# Patient Record
Sex: FEMALE | Race: BLACK OR AFRICAN AMERICAN | Employment: UNEMPLOYED | URBAN - METROPOLITAN AREA
[De-identification: names, ages, dates, MRNs, and addresses within clinical notes are randomized per-mention and may not be internally consistent; named-entity substitution may affect disease eponyms.]

---

## 2017-03-10 ENCOUNTER — HOSPITAL ENCOUNTER (EMERGENCY)
Age: 37
Discharge: HOME | End: 2017-03-10
Attending: EMERGENCY MEDICINE
Payer: MEDICAID

## 2017-03-10 VITALS
DIASTOLIC BLOOD PRESSURE: 119 MMHG | SYSTOLIC BLOOD PRESSURE: 158 MMHG | RESPIRATION RATE: 16 BRPM | OXYGEN SATURATION: 100 % | TEMPERATURE: 98.6 F | HEART RATE: 101 BPM

## 2017-03-10 DIAGNOSIS — L02.419 CELLULITIS AND ABSCESS OF LEG, EXCEPT FOOT: Primary | ICD-10-CM

## 2017-03-10 DIAGNOSIS — L03.119 CELLULITIS AND ABSCESS OF LEG, EXCEPT FOOT: Primary | ICD-10-CM

## 2017-03-10 DIAGNOSIS — I15.9 SECONDARY HYPERTENSION: ICD-10-CM

## 2017-03-10 PROCEDURE — 99283 EMERGENCY DEPT VISIT LOW MDM: CPT

## 2017-03-10 PROCEDURE — 74011000250 HC RX REV CODE- 250: Performed by: EMERGENCY MEDICINE

## 2017-03-10 PROCEDURE — 75810000293 HC SIMP/SUPERF WND  RPR

## 2017-03-10 PROCEDURE — 74011250637 HC RX REV CODE- 250/637: Performed by: EMERGENCY MEDICINE

## 2017-03-10 RX ORDER — DOXYCYCLINE 100 MG/1
100 CAPSULE ORAL
Status: COMPLETED | OUTPATIENT
Start: 2017-03-10 | End: 2017-03-10

## 2017-03-10 RX ORDER — LIDOCAINE HYDROCHLORIDE AND EPINEPHRINE 10; 10 MG/ML; UG/ML
10 INJECTION, SOLUTION INFILTRATION; PERINEURAL ONCE
Status: COMPLETED | OUTPATIENT
Start: 2017-03-10 | End: 2017-03-10

## 2017-03-10 RX ORDER — DOXYCYCLINE 100 MG/1
100 CAPSULE ORAL 2 TIMES DAILY
Qty: 14 CAP | Refills: 0 | Status: SHIPPED | OUTPATIENT
Start: 2017-03-10 | End: 2017-03-17

## 2017-03-10 RX ADMIN — DOXYCYCLINE 100 MG: 100 CAPSULE ORAL at 21:47

## 2017-03-10 RX ADMIN — LIDOCAINE HYDROCHLORIDE AND EPINEPHRINE 100 MG: 10; 10 INJECTION, SOLUTION INFILTRATION; PERINEURAL at 21:45

## 2017-03-11 NOTE — ED PROVIDER NOTES
HPI Comments: 9:47 PM Santiago Jones is a 30 y.o. female with a history of HTN and Anemia who presents to the emergency department for evaluation of a R leg abscess onset yesterday. The patient explains today she attempted to \"squeeze pus out\" with moderate relief. Pt states taking Tylenol for the symptoms earlier this morning. LMP was on 3/7. Pt denies tobacco usage. Pt admits alcohol usage. No other concerns at this time. PCP: None      The history is provided by the patient. Past Medical History:   Diagnosis Date    Anemia        Past Surgical History:   Procedure Laterality Date     DELIVERY ONLY           History reviewed. No pertinent family history. Social History     Social History    Marital status: SINGLE     Spouse name: N/A    Number of children: N/A    Years of education: N/A     Occupational History    Not on file. Social History Main Topics    Smoking status: Never Smoker    Smokeless tobacco: Not on file    Alcohol use No    Drug use: No    Sexual activity: Yes     Partners: Male     Other Topics Concern    Not on file     Social History Narrative         ALLERGIES: Review of patient's allergies indicates no known allergies. Review of Systems   Constitutional: Negative. HENT: Negative. Eyes: Negative. Respiratory: Negative. Cardiovascular: Negative. Gastrointestinal: Negative. Endocrine: Negative. Genitourinary: Negative. Musculoskeletal: Negative. Skin: Positive for wound (R leg abscess ). Allergic/Immunologic: Negative. Neurological: Negative. Hematological: Negative. Psychiatric/Behavioral: Negative. All other systems reviewed and are negative. Vitals:    03/10/17 2130   BP: (!) 158/119   Pulse: (!) 101   Resp: 16   Temp: 98.6 °F (37 °C)   SpO2: 100%            Physical Exam   Constitutional: She is oriented to person, place, and time. She appears well-developed and well-nourished.    HENT:   Head: Normocephalic and atraumatic. Eyes: Conjunctivae and EOM are normal. Pupils are equal, round, and reactive to light. Neck: Normal range of motion. Neck supple. Cardiovascular: Normal rate, regular rhythm, normal heart sounds and intact distal pulses. Pulmonary/Chest: Effort normal and breath sounds normal.   Abdominal: Soft. Bowel sounds are normal.   Musculoskeletal: Normal range of motion. Neurological: She is alert and oriented to person, place, and time. Skin: Skin is warm and dry. There is erythema. Abscess with mild fluctuance right lower extremity  Redness surrounding approximately 2 inch   Psychiatric: She has a normal mood and affect. Her behavior is normal. Judgment and thought content normal.   Nursing note and vitals reviewed. [facility]  ED Course       I&D Abcess Simple  Date/Time: 3/10/2017 9:54 PM  Performed by: Beth Mckay  Authorized by: Beth Mckay     Consent:     Consent obtained:  Verbal and written    Consent given by:  Patient    Risks discussed:  Bleeding and infection    Alternatives discussed:  No treatment  Location:     Type:  Abscess    Size:  3cm    Location:  Lower extremity    Lower extremity location:  Leg    Leg location:  R lower leg  Pre-procedure details:     Skin preparation:  Betadine  Anesthesia (see MAR for exact dosages): Anesthesia method:  None  Procedure type:     Complexity:  Simple  Procedure details:     Needle aspiration: no      Incision types:  Single straight    Incision depth:  Dermal    Scalpel blade:  11    Wound management:  Extensive cleaning    Drainage:  Serosanguinous    Drainage amount:  Scant    Wound treatment:  Wound left open    Packing materials:  None  Post-procedure details:     Patient tolerance of procedure:   Tolerated well, no immediate complications                 Vitals:  Patient Vitals for the past 12 hrs:   Temp Pulse Resp BP SpO2   03/10/17 2130 98.6 °F (37 °C) (!) 101 16 (!) 158/119 100 %     100% on RA, indicating adequate oxygenation. Medications ordered:   Medications   lidocaine-EPINEPHrine (XYLOCAINE) 1 %-1:100,000 injection 100 mg (100 mg IntraDERMal Given by Provider 3/10/17 2145)   doxycycline (MONODOX) capsule 100 mg (100 mg Oral Given 3/10/17 2147)         Lab findings:  No results found for this or any previous visit (from the past 12 hour(s)). X-Ray, CT or other radiology findings or impressions:  No orders to display         Disposition:  Diagnosis:   1. Cellulitis and abscess of leg, except foot    2. Secondary hypertension        Disposition: Discharged    Follow-up Information     Follow up With Details Comments [address] Schedule an appointment as soon as possible for a visit in 2 days  [address] [facility] EMERGENCY DEPT  If symptoms worsen [address] [facility] [address].  [phone]            Patient's Medications   Start Taking    DOXYCYCLINE (MONODOX) 100 MG CAPSULE    Take 1 Cap by mouth two (2) times a day for 7 days. Continue Taking    FOLIC ACID (FOLVITE) 1 MG TABLET    Take 1 mg by mouth three (3) times daily. PNV WITH CA NO.65-IRON POLY-FA 60 MG IRON-1 MG CAP    Take 1 Tab by mouth daily. These Medications have changed    No medications on file   Stop Taking    FERROUS SULFATE (FEOSOL) 325 MG (65 MG IRON) TABLET    Take 1 Tab by mouth Daily (before breakfast). Umair Pop 8 for and in presence of Fredo Andrew MD (03/10/17)      Physician Attestation  I personally preformed the services described in this documentation, reviewed and edited the documentation which was dictated to the scribe in my presence, and it accurately records my words and actions.      Fredo Andrew MD (03/10/17)      Signed by: Umair Kahn, 03/10/17, 9:47 PM

## 2017-03-11 NOTE — ED TRIAGE NOTES
\"I think I got bit by something. \" Pt has circular area of redness and swelling to R lower leg with drainage.

## 2017-03-11 NOTE — DISCHARGE INSTRUCTIONS

## 2017-03-11 NOTE — ED NOTES
I have reviewed discharge instructions with the patient. The patient verbalized understanding.  Pt ambulatory to ED lobby at time of dc in no distress, agreeable to dc plan